# Patient Record
Sex: MALE | NOT HISPANIC OR LATINO | Employment: OTHER | URBAN - METROPOLITAN AREA
[De-identification: names, ages, dates, MRNs, and addresses within clinical notes are randomized per-mention and may not be internally consistent; named-entity substitution may affect disease eponyms.]

---

## 2017-03-08 ENCOUNTER — COMMUNICATION - HEALTHEAST (OUTPATIENT)
Dept: UROLOGY | Facility: CLINIC | Age: 37
End: 2017-03-08

## 2017-03-10 ENCOUNTER — OFFICE VISIT - HEALTHEAST (OUTPATIENT)
Dept: UROLOGY | Facility: CLINIC | Age: 37
End: 2017-03-10

## 2017-03-10 DIAGNOSIS — N13.2 HYDRONEPHROSIS WITH URINARY OBSTRUCTION DUE TO URETERAL CALCULUS: ICD-10-CM

## 2017-03-10 DIAGNOSIS — R82.81 PYURIA: ICD-10-CM

## 2017-03-10 DIAGNOSIS — R50.9 FEVER, UNSPECIFIED FEVER CAUSE: ICD-10-CM

## 2017-03-10 DIAGNOSIS — N20.9 URINARY CALCULUS, UNSPECIFIED: ICD-10-CM

## 2017-03-10 DIAGNOSIS — N20.0 CALCULUS OF KIDNEY: ICD-10-CM

## 2017-03-10 DIAGNOSIS — N20.1 CALCULUS OF URETER: ICD-10-CM

## 2017-03-10 DIAGNOSIS — R79.82 ELEVATED C-REACTIVE PROTEIN (CRP): ICD-10-CM

## 2017-03-10 DIAGNOSIS — N23 RENAL COLIC: ICD-10-CM

## 2017-03-10 ASSESSMENT — MIFFLIN-ST. JEOR: SCORE: 2369.77

## 2017-03-14 ENCOUNTER — COMMUNICATION - HEALTHEAST (OUTPATIENT)
Dept: UROLOGY | Facility: CLINIC | Age: 37
End: 2017-03-14

## 2017-03-17 ENCOUNTER — OFFICE VISIT - HEALTHEAST (OUTPATIENT)
Dept: UROLOGY | Facility: CLINIC | Age: 37
End: 2017-03-17

## 2017-03-17 ENCOUNTER — HOSPITAL ENCOUNTER (OUTPATIENT)
Dept: CT IMAGING | Facility: CLINIC | Age: 37
Discharge: HOME | End: 2017-03-17
Attending: PHYSICIAN ASSISTANT

## 2017-03-17 DIAGNOSIS — N20.0 CALCULUS OF KIDNEY: ICD-10-CM

## 2017-03-17 DIAGNOSIS — N28.1 RENAL CYST, LEFT: ICD-10-CM

## 2017-03-17 DIAGNOSIS — N20.9 URINARY CALCULUS, UNSPECIFIED: ICD-10-CM

## 2017-03-17 DIAGNOSIS — N23 RENAL COLIC: ICD-10-CM

## 2017-03-17 DIAGNOSIS — N28.89 RENAL MASS: ICD-10-CM

## 2017-03-17 DIAGNOSIS — N20.1 CALCULUS OF URETER: ICD-10-CM

## 2017-03-31 ENCOUNTER — OFFICE VISIT - HEALTHEAST (OUTPATIENT)
Dept: UROLOGY | Facility: CLINIC | Age: 37
End: 2017-03-31

## 2017-03-31 ENCOUNTER — HOSPITAL ENCOUNTER (OUTPATIENT)
Dept: CT IMAGING | Facility: CLINIC | Age: 37
Discharge: HOME | End: 2017-03-31
Attending: SPECIALIST

## 2017-03-31 DIAGNOSIS — N20.9 URINARY CALCULUS, UNSPECIFIED: ICD-10-CM

## 2017-03-31 DIAGNOSIS — N23 RENAL COLIC: ICD-10-CM

## 2017-03-31 DIAGNOSIS — N20.0 CALCULUS OF KIDNEY: ICD-10-CM

## 2017-03-31 DIAGNOSIS — N20.1 CALCULUS OF URETER: ICD-10-CM

## 2017-04-13 ENCOUNTER — HOSPITAL ENCOUNTER (OUTPATIENT)
Dept: CT IMAGING | Facility: CLINIC | Age: 37
Discharge: HOME | End: 2017-04-13
Attending: PHYSICIAN ASSISTANT

## 2017-04-13 ENCOUNTER — OFFICE VISIT - HEALTHEAST (OUTPATIENT)
Dept: UROLOGY | Facility: CLINIC | Age: 37
End: 2017-04-13

## 2017-04-13 DIAGNOSIS — N20.1 CALCULUS OF URETER: ICD-10-CM

## 2017-04-13 DIAGNOSIS — N20.9 URINARY CALCULUS, UNSPECIFIED: ICD-10-CM

## 2017-06-09 ENCOUNTER — OFFICE VISIT - HEALTHEAST (OUTPATIENT)
Dept: UROLOGY | Facility: CLINIC | Age: 37
End: 2017-06-09

## 2017-06-09 ENCOUNTER — HOSPITAL ENCOUNTER (OUTPATIENT)
Dept: CT IMAGING | Facility: CLINIC | Age: 37
Discharge: HOME | End: 2017-06-09
Attending: UROLOGY

## 2017-06-09 DIAGNOSIS — N20.9 URINARY CALCULUS, UNSPECIFIED: ICD-10-CM

## 2017-06-09 DIAGNOSIS — N20.1 CALCULUS OF URETER: ICD-10-CM

## 2017-06-09 DIAGNOSIS — N20.0 CALCULUS OF KIDNEY: ICD-10-CM

## 2021-05-30 VITALS — WEIGHT: 308 LBS | BODY MASS INDEX: 38.3 KG/M2 | HEIGHT: 75 IN

## 2021-06-09 NOTE — PROGRESS NOTES
Assessment/Plan:        Diagnoses and all orders for this visit:    Calculus of ureter  -     Symptom Control While Passing a Stone Education  -     CT Abdomen Pelvis Without Oral Without IV Contrast; Future; Expected date: 4/14/17    Calculus of kidney    Urinary calculus, unspecified  -     POCT urinalysis dipstick-[facility]      Stone Management Plan  [facility] Stone [facility] 3/10/2017 3/17/2017 3/31/2017   Urinary Tract Infection No suspicion of infection No suspicion of infection No suspicion of infection   Renal Colic Well controlled symptoms Well controlled symptoms Well controlled symptoms   Renal Failure No suspicion of renal failure No suspicion of renal failure No suspicion of renal failure   Current CT date 3/8/2017 3/17/2017 3/31/2017   Right sided stones? No No No   R Stone Event No current event No current event No current event   Left sided stones? Yes Yes Yes   L Number of ureteral stones 1 1 1   L GSD of ureteral stones 3 3 3   L Location of ureteral stone Proximal Distal Distal   L Number of kidney stones  1 1 Renal stones unchanged from last exam   L GSD of kidney stones 2 - 4 2 - 4 2 - 4   L Hydronephrosis Mild Mild None   L Stone Event New event Established event Established event   Diagnosis date 3/8/2017 - -   Initial location of primary symptomatic stone Proximal - -   Initial GSD of primary symptomatic stone 3 - -   L MET Status Initiation Progression No progression   L Current Plan MET MET MET   MET 1 week F/U 2 week F/U 2 week F/U             Subjective:      HPI  Mr. Brian M Haase is a 36 y.o.  male returning to the [facility] [place] for medical expulsive therapy follow up.     On last encounter, his 3 mm stone was in left distal ureter with mild hydronephrosis. He has had no unanticipated events.    Symptoms have been well controlled with medication and he is able to carry on normal activities. He is asymptomatic at present. He denies symptoms of fever, chills,  flank pain, nausea, vomiting, urinary frequency and dysuria.     New CT scan was personally reviewed and demonstrates no progression of stone with resolution of previous hydronephrosis. No change to 2 mm left mid pole calculus. Known, left upper pole cyst.    PLAN    First time stone former with no further progression of non-obstructing 2-3 mm left distal ureteral stone, asymptomatic.    He will continue to attempt to pass stone and will return in 2 weeks with further imaging.  If asymptomatic he may put this off for a week or 2.    Patient also seen and examined by JOSUE Lieberman   Review of systems is negative except for HPI.    Past Medical History:   Diagnosis Date     Fatty liver      Kidney stone 03/08/2017    at agge 36       Past Surgical History:   Procedure Laterality Date     ANTERIOR CRUCIATE LIGAMENT REPAIR       HAND SURGERY Right     reove some polyps       Current Outpatient Prescriptions   Medication Sig Dispense Refill     ciprofloxacin HCl (CIPRO) 250 MG tablet TK TWO TS PO BID FOR 14 DOSES  0     tamsulosin (FLOMAX) 0.4 mg Cp24 Take 1 capsule (0.4 mg total) by mouth daily for 14 days. 14 capsule 1     No current facility-administered medications for this visit.        No Known Allergies    Social History     Social History     Marital status: Single     Spouse name: N/A     Number of children: N/A     Years of education: N/A     Occupational History     Not on file.     Social History Main Topics     Smoking status: Never Smoker     Smokeless tobacco: Never Used     Alcohol use Yes      Comment: rare     Drug use: Yes     Special: Marijuana      Comment: Vapor type     Sexual activity: Not on file     Other Topics Concern     Not on file     Social History Narrative       Family History   Problem Relation Age of Onset     Urolithiasis Father      Diabetes Father      Cancer Paternal Grandfather      unknown type     Objective:      Physical Exam  There were no vitals filed for this  visit.  General - well developed, well nourished, appropriate for age. Appears no distress at this time  Abdomen - mildly obese soft, non-tender, no hepatosplenomegaly, no masses.   - no flank tenderness, no suprapubic tenderness, kidney and bladder non-palpable  MSK - normal spinal curvature. no spinal tenderness. normal gait. muscular strength intact.  Psych - oriented to time, place, and person, normal mood and affect.      Labs   Urinalysis POC (Office):  Blood UA   Date Value Ref Range Status   03/31/2017 Negative Negative Final   03/17/2017 Moderate Negative Final   03/10/2017 Trace Negative Final     Nitrite, UA   Date Value Ref Range Status   03/31/2017 Negative Negative Final   03/17/2017 Negative Negative Final   03/10/2017 Negative Negative Final   03/08/2017 Negative Negative Final     Leukocytes, UA    Date Value Ref Range Status   03/31/2017 Negative Negative Final   03/17/2017 Moderate (!) Negative Final   03/10/2017 Negative Negative Final     pH UA   Date Value Ref Range Status   03/31/2017 5.5 4.5 - 8.0 Final   03/17/2017 6.0 4.5 - 8.0 Final   03/10/2017 5.5 4.5 - 8.0 Final       Lab Urinalysis:  Blood, UA   Date Value Ref Range Status   03/08/2017 Large (!) Negative Final     Nitrite, UA   Date Value Ref Range Status   03/31/2017 Negative Negative Final   03/17/2017 Negative Negative Final   03/10/2017 Negative Negative Final   03/08/2017 Negative Negative Final     Leukocytes, UA   Date Value Ref Range Status   03/08/2017 Negative Negative Final     pH, UA   Date Value Ref Range Status   03/08/2017 7.5 4.5 - 8.0 Final

## 2021-06-09 NOTE — PROGRESS NOTES
Assessment/Plan:        Diagnoses and all orders for this visit:    Calculus of ureter  -     HYDROmorphone tablet 2-4 mg (DILAUDID); Take 1-2 tablets (2-4 mg total) by mouth once.  -     ketorolac injection 60 mg (TORADOL); Inject 2 mL (60 mg total) into the shoulder, thigh, or buttocks once.  -     Renal Function Profile  -     Symptom Control While Passing a Stone Education  -     CT Abdomen Pelvis Without Oral Without IV Contrast; Future; Expected date: 3/17/17  -     tamsulosin (FLOMAX) 0.4 mg Cp24; Take 1 capsule (0.4 mg total) by mouth daily for 14 days.  Dispense: 14 capsule; Refill: 1  -     HYDROmorphone (DILAUDID) 2 MG tablet; Take 1-2 tablets (2-4 mg total) by mouth every 4 (four) hours as needed for pain.  Dispense: 40 tablet; Refill: 0    Elevated C-reactive protein (CRP)  -     Discontinue: ciprofloxacin HCl tablet 500 mg (CIPRO); Take 1 tablet (500 mg total) by mouth once.  -     Discontinue: ciprofloxacin HCl (CIPRO) tablet 750 mg; Take 750 mg by mouth once.    Fever, unspecified fever cause  -     Culture, Urine  -     HM1(CBC and Differential)  -     C-Reactive Protein(CRP)  -     Renal Function Profile  -     HM1 (CBC with Diff)    Pyuria  -     Culture, Urine  -     C-Reactive Protein(CRP)    Renal colic  -     ketorolac (TORADOL) 10 mg tablet; Take 1 tablet (10 mg total) by mouth every 6 (six) hours as needed.  Dispense: 20 tablet; Refill: 0    Hydronephrosis with urinary obstruction due to ureteral calculus    Calculus of kidney    Urinary calculus, unspecified  -     POCT urinalysis dipstick-[facility]    Other orders  -     ketorolac (TORADOL) 60 mg/2 mL injection;   -     HYDROmorphone (DILAUDID) 2 MG tablet;   -     ciprofloxacin HCl tablet 500 mg (CIPRO); Take 1 tablet (500 mg total) by mouth once.  -     ciprofloxacin HCl tablet 250 mg (CIPRO); Take 1 tablet (250 mg total) by mouth once.  -     ciprofloxacin HCl (CIPRO) 250 MG tablet;   -     ciprofloxacin HCl (CIPRO) 500 MG tablet;        Stone Management Plan  [facility] Stone Management 3/10/2017   Urinary Tract Infection No suspicion of infection   Renal Colic Well controlled symptoms   Renal Failure No suspicion of renal failure   Current CT date 3/3/2017   Right sided stones? No   R Stone Event No current event   Left sided stones? Yes   L Number of ureteral stones 1   L GSD of ureteral stones 3   L Location of ureteral stone Proximal   L Number of kidney stones  1   L GSD of kidney stones 2 - 4   L Hydronephrosis Mild   L Stone Event New event   Diagnosis date 3/8/2017   Initial location of primary symptomatic stone Proximal   Initial GSD of primary symptomatic stone 3   L MET Status Initiation   L Current Plan MET   MET 2 week F/U         Subjective:      HPI  Mr. Brian M Haase is a 36 y.o.  male presenting to the [facility] [place] for after recent [facility] ED visit for urolithiasis.    He is a first time unidentified composition stone former. He has no identified modifiable stone risk factors. He has identified non-modifiable stone risks including:  limited family history.    Primary symptom at presentation was acute onset, sharp, constant left lower abdominal pain x couple hours. The pain radiated to the left flank. The pain fluctuated in severity, reaching 9/10 at its worst with no associated alleviating or aggravating factors. No similar pain events in the past. Significant associated symptoms at presentation included:  decreased appetite. He was seen in the ED 3/8 with CT reporting a mildly obstructing 2 mm left proximal ureteral stone. Labs demonstrated no leukocytosis or renal impairment. Urinalysis demonstrated mild pyuria. No urine culture ordered. He was discharged home with ciprofloxacin and percocet. He never picked up the antibiotic. He has been taking 1 tab percocet every 6 hours. Additionally, he admits to using vaporized marijuana for pain, with minimal relief. Significant current symptoms include:   Worsening 4/10 left flank pain and abdominal discomfort. He states he feels constipated, with no bowel movement x 2 days. Pertinent negative current symptoms include:  fever, chills, right flank pain, nausea, vomiting, hematuria, urinary frequency and dysuria.     CT scan from 3/8/2017 is personally reviewed and demonstrates a mildly obstructing 3-4 mm left proximal ureteral calculus. Additional 2 mm left mid pole calculus.  There is ~  7 cm left upper pole indeterminate mass which may represent a complex cyst or diverticulum. He mentions he was made aware of a renal cyst ~ 4 years ago with no further follow up.    Significant labs from presentation include severe hematuria, mild pyuria, negative nitrite, no bacteria, normal WBC, normal creatinine and mildly low potassium (3.2). Acute labs repeated today demonstrate stable WBC (11.4), elevated CRP (6.3), and acute renal insufficiency creatinine (1.69), K (3.9).     Provided in office injection of toradol 60 mg IM once and dilaudid 2 mg 2 tabs for relief of acute renal colic.     PLAN    First time stone former with low grade fever on presentation, known mildly obstructing left proximal ureteral calculus. Suspicion for evolving infection with elevated CRP.    Before exit, Jack was given an oral dose of ciprofloxacin 750 mg once. He is aware to fill his ciprofloxacin prescription and start antibiotic tonight. Additionally, he was provided prescriptions for flomax, toradol (in place of ibuprofen), and dilaudid. Recommend over the counter medications of ibuprofen and dramamine.    Discussed proceeding with stent insertion today for drainage of kidney, given possible evolving urinary tract infection with obstructing stone. However, patient declines at this time. He understands he needs to return to ED if fever progresses or he develops intractable pain or vomiting. Will proceed with medical expulsive therapy. Risks and benefits were detailed of medical expulsive therapy  including probability of stone passage, recurrent renal colic, and requirement of emergency medical and/or surgical care and further imaging. Patient verbalized understanding. Patient agrees with plan as discussed. He will return in 1 week with CT without and with IV contrast to evaluate ureteral stone and left upper pole mass..    Patient also seen and examined by JOSUE Lieberman   Review of Systems  A 12 point comprehensive review of systems is negative except for HPI    Past Medical History:   Diagnosis Date     Fatty liver      Kidney stone 03/08/2017    at agge 36     Past Surgical History:   Procedure Laterality Date     ANTERIOR CRUCIATE LIGAMENT REPAIR       HAND SURGERY Right     reove some polyps     Current Outpatient Prescriptions   Medication Sig Dispense Refill     oxyCODONE-acetaminophen (PERCOCET) 5-325 mg per tablet Take 1 tablet by mouth every 6 (six) hours as needed. 12 tablet 0     ciprofloxacin HCl (CIPRO) 500 MG tablet Take 1 tablet (500 mg total) by mouth 2 (two) times a day for 14 doses. 14 tablet 0     No current facility-administered medications for this visit.      No Known Allergies    Social History     Social History     Marital status: Single     Spouse name: N/A     Number of children: N/A     Years of education: N/A     Occupational History     Not on file.     Social History Main Topics     Smoking status: Never Smoker     Smokeless tobacco: Never Used     Alcohol use Yes      Comment: rare     Drug use: Yes     Special: Marijuana      Comment: Vapor type     Sexual activity: Not on file     Other Topics Concern     Not on file     Social History Narrative     No narrative on file       Family History   Problem Relation Age of Onset     Urolithiasis Father      Diabetes Father      Cancer Paternal Grandfather      unknown type       Objective:      Physical Exam  Vitals:    03/10/17 1004   BP: 134/84   Pulse: 99   Temp: 99.7  F (37.6  C)     General - well developed, well  nourished, appropriate for age. Appears moderately uncomfortable, clammy   Heart - regular rate and rhythm, no murmur  Respiratory - normal effort, clear to auscultation, good air entry without adventitious noises  Abdomen - moderately obese soft, non-tender, no hepatosplenomegaly, no masses.   - left flank  moderate tenderness, no suprapubic tenderness, kidney and bladder non-palpable  MSK - normal spinal curvature. no spinal tenderness. normal gait. muscular strength intact.  Neurology - cranial nerves II-XII grossly intact, normal sensation, no unsteadiness  Skin - intact, no bruising, no gouty tophi  Psych - oriented to time, place, and person, normal mood and affect.    Labs  Urinalysis POC (Office):  BLOOD UA   Date Value Ref Range Status   03/10/2017 Trace Negative Final     NITRITE, UA   Date Value Ref Range Status   03/10/2017 Negative Negative Final   03/08/2017 Negative Negative Final     LEUKOCYTES, UA    Date Value Ref Range Status   03/10/2017 Negative Negative Final     PH UA   Date Value Ref Range Status   03/10/2017 5.5 4.5 - 8.0 Final       Lab Urinalysis:  BLOOD, UA   Date Value Ref Range Status   03/08/2017 Large (!) Negative Final     NITRITE, UA   Date Value Ref Range Status   03/10/2017 Negative Negative Final   03/08/2017 Negative Negative Final     LEUKOCYTES, UA   Date Value Ref Range Status   03/08/2017 Negative Negative Final     PH, UA   Date Value Ref Range Status   03/08/2017 7.5 4.5 - 8.0 Final    and Acute Labs   CBC   WBC   Date Value Ref Range Status   03/08/2017 10.5 4.0 - 11.0 thou/uL Final     HEMOGLOBIN   Date Value Ref Range Status   03/08/2017 15.5 14.0 - 18.0 g/dL Final     PLATELETS   Date Value Ref Range Status   03/08/2017 249 140 - 440 thou/uL Final    and Renal Panel  KSI  CREATININE   Date Value Ref Range Status   03/08/2017 1.23 0.70 - 1.30 mg/dL Final     POTASSIUM   Date Value Ref Range Status   03/08/2017 3.2 (L) 3.5 - 5.0 mmol/L Final     CALCIUM   Date Value  Ref Range Status   03/08/2017 9.6 8.5 - 10.5 mg/dL Final

## 2021-06-09 NOTE — PROGRESS NOTES
Assessment/Plan:        Diagnoses and all orders for this visit:    Urinary calculus, unspecified  -     POCT urinalysis dipstick-[facility]  -     ketorolac (TORADOL) 10 mg tablet; Take 1 tablet (10 mg total) by mouth every 6 (six) hours as needed for pain.  Dispense: 20 tablet; Refill: 0  -     Symptom Control While Passing a Stone Education  -     CT Abdomen Pelvis Without Oral Without IV Contrast; Future; Expected date: 3/31/17    Calculus of ureter  -     tamsulosin (FLOMAX) 0.4 mg Cp24; Take 1 capsule (0.4 mg total) by mouth daily for 14 days.  Dispense: 14 capsule; Refill: 1  -     HYDROmorphone (DILAUDID) 2 MG tablet; Take 1-2 tablets (2-4 mg total) by mouth every 4 (four) hours as needed for pain.  Dispense: 40 tablet; Refill: 0  -     ketorolac (TORADOL) 10 mg tablet; Take 1 tablet (10 mg total) by mouth every 6 (six) hours as needed for pain.  Dispense: 20 tablet; Refill: 0  -     Symptom Control While Passing a Stone Education  -     CT Abdomen Pelvis Without Oral Without IV Contrast; Future; Expected date: 3/31/17    Renal colic  -     ketorolac (TORADOL) 10 mg tablet; Take 1 tablet (10 mg total) by mouth every 6 (six) hours as needed for pain.  Dispense: 20 tablet; Refill: 0  -     Symptom Control While Passing a Stone Education  -     CT Abdomen Pelvis Without Oral Without IV Contrast; Future; Expected date: 3/31/17    Calculus of kidney  -     Symptom Control While Passing a Stone Education  -     CT Abdomen Pelvis Without Oral Without IV Contrast; Future; Expected date: 3/31/17    Renal cyst, left    Other orders  -     ciprofloxacin HCl (CIPRO) 250 MG tablet; TK TWO TS PO BID FOR 14 DOSES; Refill: 0      Stone Management Plan  [facility] Stone Management 3/10/2017 3/17/2017   Urinary Tract Infection No suspicion of infection No suspicion of infection   Renal Colic Well controlled symptoms Well controlled symptoms   Renal Failure No suspicion of renal failure No suspicion of renal failure   Current CT date  3/8/2017 3/17/2017   Right sided stones? No No   R Stone Event No current event No current event   Left sided stones? Yes Yes   L Number of ureteral stones 1 1   L GSD of ureteral stones 3 3   L Location of ureteral stone Proximal Distal   L Number of kidney stones  1 1   L GSD of kidney stones 2 - 4 2 - 4   L Hydronephrosis Mild Mild   L Stone Event New event Established event   Diagnosis date 3/8/2017 -   Initial location of primary symptomatic stone Proximal -   Initial GSD of primary symptomatic stone 3 -   L MET Status Initiation Progression   L Current Plan MET MET   MET 1 week F/U 2 week F/U             Subjective:      HPI  Mr. Brian M Haase is a 36 y.o.  male returning to the [facility] [place] for follow-up of left proximal ureteral stone diagnosed 3/8/2017 measuring 3-4 mm in size with a 2 mm left lower pole stone.  Patient had a 7 cm left upper pole mass that appeared most likely to be a complex cyst.  He presents at this time with symptoms well controlled with follow-up CT scan having been obtained without with IV contrast.  Patient has had no pain in the last several days and no voiding symptoms of urgency or frequency.  UA today specific gravity 1.010.  6 moderate blood negative nitrate moderate leukocytes.      Personal review of CT scan obtained today 3/17/2017 without and with IV contrast demonstrates a nonenhancing 7 cm cyst in the left upper pole HU 12-13.  The 2 mm stone is again apparent in the lower pole and a 3-4 mm stone is seen in the left distal ureter 4-5 cm from the UVJ.  There is mild hydroureteronephrosis.  There are no stones seen on the right.     Impression left ureteral stone without distal having progressed from proximal, known 2 mm left renal stone with nonenhancing left upper pole renal cyst.    Plan continue medical expulsive therapy with Flomax, ibuprofen and use of Toradol with acute pain and if ineffective Dilaudid follow-up 2 weeks and may extend to  3 weeks for stone has not passed and is asymptomatic.  Patient given additional Flomax and Toradol and Dilaudid.                   ROS   Review of systems is negative except for HPI.    Past Medical History:   Diagnosis Date     Fatty liver      Kidney stone 03/08/2017    at agge 36       Past Surgical History:   Procedure Laterality Date     ANTERIOR CRUCIATE LIGAMENT REPAIR       HAND SURGERY Right     reove some polyps       Current Outpatient Prescriptions   Medication Sig Dispense Refill     HYDROmorphone (DILAUDID) 2 MG tablet Take 1-2 tablets (2-4 mg total) by mouth every 4 (four) hours as needed for pain. 40 tablet 0     oxyCODONE-acetaminophen (PERCOCET) 5-325 mg per tablet Take 1 tablet by mouth every 6 (six) hours as needed. 12 tablet 0     tamsulosin (FLOMAX) 0.4 mg Cp24 Take 1 capsule (0.4 mg total) by mouth daily for 14 days. 14 capsule 1     No current facility-administered medications for this visit.        No Known Allergies    Social History     Social History     Marital status: Single     Spouse name: N/A     Number of children: N/A     Years of education: N/A     Occupational History     Not on file.     Social History Main Topics     Smoking status: Never Smoker     Smokeless tobacco: Never Used     Alcohol use Yes      Comment: rare     Drug use: Yes     Special: Marijuana      Comment: Vapor type     Sexual activity: Not on file     Other Topics Concern     Not on file     Social History Narrative     No narrative on file       Family History   Problem Relation Age of Onset     Urolithiasis Father      Diabetes Father      Cancer Paternal Grandfather      unknown type     Objective:      Physical Exam  Vitals:    03/17/17 0818   BP: 130/80   Pulse: 91   Temp: 98  F (36.7  C)     General - well developed, well nourished, appropriate for age. Appears no distress at this time  Abdomen - mildly obese soft, non-tender, no hepatosplenomegaly, no masses.   - no flank tenderness, no suprapubic  tenderness, kidney and bladder non-palpable  MSK - normal spinal curvature. no spinal tenderness. normal gait. muscular strength intact.  Psych - oriented to time, place, and person, normal mood and affect.      Labs   Urinalysis POC (Office):  BLOOD UA   Date Value Ref Range Status   03/17/2017 Moderate Negative Final   03/10/2017 Trace Negative Final     NITRITE, UA   Date Value Ref Range Status   03/17/2017 Negative Negative Final   03/10/2017 Negative Negative Final   03/08/2017 Negative Negative Final     LEUKOCYTES, UA    Date Value Ref Range Status   03/17/2017 Moderate (!) Negative Final   03/10/2017 Negative Negative Final     PH UA   Date Value Ref Range Status   03/17/2017 6.0 4.5 - 8.0 Final   03/10/2017 5.5 4.5 - 8.0 Final       Lab Urinalysis:  BLOOD, UA   Date Value Ref Range Status   03/08/2017 Large (!) Negative Final     NITRITE, UA   Date Value Ref Range Status   03/17/2017 Negative Negative Final   03/10/2017 Negative Negative Final   03/08/2017 Negative Negative Final     LEUKOCYTES, UA   Date Value Ref Range Status   03/08/2017 Negative Negative Final     PH, UA   Date Value Ref Range Status   03/08/2017 7.5 4.5 - 8.0 Final

## 2021-06-10 NOTE — PROGRESS NOTES
Assessment/Plan:        Diagnoses and all orders for this visit:    Calculus of ureter  -     Symptom Control While Passing a Stone Education  -     CT Abdomen Pelvis Without Oral Without IV Contrast; Future; Expected date: 5/13/17    Urinary calculus, unspecified  -     POCT urinalysis dipstick-[facility]      Stone Management Plan  [facility] 3/17/2017 3/31/2017 4/13/2017   Urinary Tract Infection No suspicion of infection No suspicion of infection No suspicion of infection   Renal Colic Well controlled symptoms Well controlled symptoms Well controlled symptoms   Renal Failure No suspicion of renal failure No suspicion of renal failure No suspicion of renal failure   Current CT date 3/17/2017 3/31/2017 4/13/2017   Right sided stones? No No No   R Stone Event No current event No current event No current event   Left sided stones? Yes Yes Yes   L Number of ureteral stones 1 1 1   L GSD of ureteral stones 3 3 3   L Location of ureteral stone Distal Distal Distal   L Number of kidney stones  1 Renal stones unchanged from last exam Renal stones unchanged from last exam   L GSD of kidney stones 2 - 4 2 - 4 2 - 4   L Hydronephrosis Mild None None   L Stone Event Established event Established event Established event   Diagnosis date - - -   Initial location of primary symptomatic stone - - -   Initial GSD of primary symptomatic stone - - -   L MET Status Progression No progression No progression   L Current Plan MET MET MET   MET 2 week F/U 2 week F/U -             Subjective:      HPI  Mr. Brian M Haase is a 36 y.o.  male returning to the [facility] [place] for medical expulsive therapy follow up.     On last encounter, his 2 mm stone was in left distal ureter with mild hydronephrosis. He has had no unanticipated events.    Symptoms have been well controlled with medication and he is able to carry on normal activities. He is asymptomatic at present. He denies symptoms of fever, chills,  flank pain, nausea, vomiting, urinary frequency and dysuria.     New CT scan was personally reviewed and demonstrates no progression of stone with no hydronephrosis.     He will continue to attempt to pass stone and will return in 1 month with further imaging. .    He is not bothered by this small distal stone but will need clearance if it persists at next encounter.     ROS   Review of systems is negative except for HPI.    Past Medical History:   Diagnosis Date     Fatty liver      Kidney stone 03/08/2017    at agge 36       Past Surgical History:   Procedure Laterality Date     ANTERIOR CRUCIATE LIGAMENT REPAIR       HAND SURGERY Right     reove some polyps       No current outpatient prescriptions on file.     No current facility-administered medications for this visit.        No Known Allergies    Social History     Social History     Marital status: Single     Spouse name: N/A     Number of children: N/A     Years of education: N/A     Occupational History     Not on file.     Social History Main Topics     Smoking status: Never Smoker     Smokeless tobacco: Never Used     Alcohol use Yes      Comment: rare     Drug use: Yes     Special: Marijuana      Comment: Vapor type     Sexual activity: Not on file     Other Topics Concern     Not on file     Social History Narrative       Family History   Problem Relation Age of Onset     Urolithiasis Father      Diabetes Father      Cancer Paternal Grandfather      unknown type     Objective:      Physical Exam  Vitals:    04/13/17 0928   BP: 115/69   Pulse: 89   Temp: 98.1  F (36.7  C)     General - well developed, well nourished, appropriate for age. Appears no distress at this time  Abdomen - mildly obese soft, non-tender, no hepatosplenomegaly, no masses.   - no flank tenderness, no suprapubic tenderness, kidney and bladder non-palpable  MSK - normal spinal curvature. no spinal tenderness. normal gait. muscular strength intact.  Psych - oriented to time, place,  and person, normal mood and affect.      Labs   Urinalysis POC (Office):  Blood UA   Date Value Ref Range Status   04/13/2017 Negative Negative Final   03/31/2017 Negative Negative Final   03/17/2017 Moderate Negative Final     Nitrite, UA   Date Value Ref Range Status   04/13/2017 Negative Negative Final   03/31/2017 Negative Negative Final   03/17/2017 Negative Negative Final   03/08/2017 Negative Negative Final     Leukocytes, UA    Date Value Ref Range Status   04/13/2017 Negative Negative Final   03/31/2017 Negative Negative Final   03/17/2017 Moderate (!) Negative Final     pH UA   Date Value Ref Range Status   04/13/2017 6.5 4.5 - 8.0 Final   03/31/2017 5.5 4.5 - 8.0 Final   03/17/2017 6.0 4.5 - 8.0 Final       Lab Urinalysis:  Blood, UA   Date Value Ref Range Status   03/08/2017 Large (!) Negative Final     Nitrite, UA   Date Value Ref Range Status   04/13/2017 Negative Negative Final   03/31/2017 Negative Negative Final   03/17/2017 Negative Negative Final   03/08/2017 Negative Negative Final     Leukocytes, UA   Date Value Ref Range Status   03/08/2017 Negative Negative Final     pH, UA   Date Value Ref Range Status   03/08/2017 7.5 4.5 - 8.0 Final

## 2021-06-11 NOTE — PROGRESS NOTES
Assessment/Plan:        Diagnoses and all orders for this visit:    Calculus of ureter    Calculus of kidney  -     Stone Formation, 24 Hour Urine x2; Standing  -     Renal Function Profile; Future; Expected date: 6/23/17  -     Uric Acid; Future; Expected date: 6/23/17  -     Magnesium; Future; Expected date: 6/23/17  -     24 Hour Urine Collection Steps Education    Urinary calculus, unspecified  -     Urinalysis Macroscopic      Stone Management Plan  [facility] 3/31/2017 4/13/2017 6/9/2017   Urinary Tract Infection No suspicion of infection No suspicion of infection No suspicion of infection   Renal Colic Well controlled symptoms Well controlled symptoms Asymptomatic at this time   Renal Failure No suspicion of renal failure No suspicion of renal failure No suspicion of renal failure   Current CT date 3/31/2017 4/13/2017 6/9/2017   Right sided stones? No No No   R Stone Event No current event No current event No current event   Left sided stones? Yes Yes No   L Number of ureteral stones 1 1 No ureteral stones   L GSD of ureteral stones 3 3 -   L Location of ureteral stone Distal Distal -   L Number of kidney stones  Renal stones unchanged from last exam Renal stones unchanged from last exam Renal stones unchanged from last exam   L GSD of kidney stones 2 - 4 2 - 4 2 - 4   L Hydronephrosis None None None   L Stone Event Established event Established event Resolved event   Diagnosis date - - -   Initial location of primary symptomatic stone - - -   Initial GSD of primary symptomatic stone - - -   Resolved date - - 6/9/2017   L MET Status No progression No progression Passed   L Current Plan MET MET Observe   MET 2 week F/U - -   Observe rationale - - Limited stone burden with good prognosis for spontaneous passage         Subjective:      HPI  Mr. Brian M Haase is a 37 y.o.  male returning to the [facility] [place] for medical expulsive therapy follow up.     On last encounter,  his 2 mm stone was in left distal ureter with no hydronephrosis. He has had no unanticipated events.    Symptoms have been minimal in the > 1 month interval since his last visit. He had a couple mild episodes of urinary urgency but has otherwise felt well. He is asymptomatic at present. He denies symptoms of fever, chills, flank pain, nausea, vomiting, urinary frequency and dysuria.     New CT scan was personally reviewed and demonstrates passage of stone with no hydronephrosis. No change to 2 mm left mid pole papillary tip calcification. No right sided stones.    PLAN     38 yo M first time stone former with interval passage of a long standing left distal ureteral calculus, no specimen retrieved. Stable, non-obstructing left mid pole calculus, asymptomatic.    He is congratulated on passing his stone. He would like to proceed with a formal stone risk evaluation. However, he wants to confirm insurance coverage before finalizing decision to complete workup. If approved, he will proceed with serum stone risk chemistries and collection of two 24 hour urine specimens.    No additional long term follow up planned at this time.       ROS   Review of systems is negative except for HPI.    Past Medical History:   Diagnosis Date     Fatty liver      Kidney stone 03/08/2017    at agge 36       Past Surgical History:   Procedure Laterality Date     ANTERIOR CRUCIATE LIGAMENT REPAIR       HAND SURGERY Right     reove some polyps       No current outpatient prescriptions on file.     No current facility-administered medications for this visit.        No Known Allergies    Social History     Social History     Marital status: Single     Spouse name: N/A     Number of children: N/A     Years of education: N/A     Occupational History     Not on file.     Social History Main Topics     Smoking status: Never Smoker     Smokeless tobacco: Never Used     Alcohol use Yes      Comment: rare     Drug use: Yes     Special: Marijuana       Comment: Vapor type     Sexual activity: Not on file     Other Topics Concern     Not on file     Social History Narrative     No narrative on file       Family History   Problem Relation Age of Onset     Urolithiasis Father      Diabetes Father      Cancer Paternal Grandfather      unknown type     Objective:      Physical Exam  Vitals:    06/09/17 0906   BP: 133/80   Pulse: 79   Temp: 98.6  F (37  C)     General - well developed, well nourished, appropriate for age. Appears no distress at this time  Abdomen - moderately obese soft, non-tender, no hepatosplenomegaly, no masses.   - no flank tenderness, no suprapubic tenderness, kidney and bladder non-palpable  MSK - normal spinal curvature. no spinal tenderness. normal gait. muscular strength intact.  Psych - oriented to time, place, and person, normal mood and affect.      Labs   Urinalysis POC (Office):  Blood UA   Date Value Ref Range Status   04/13/2017 Negative Negative Final   03/31/2017 Negative Negative Final   03/17/2017 Moderate Negative Final     Nitrite, UA   Date Value Ref Range Status   06/09/2017 Negative Negative Final   04/13/2017 Negative Negative Final   03/31/2017 Negative Negative Final   03/17/2017 Negative Negative Final   03/08/2017 Negative Negative Final     Leukocytes, UA    Date Value Ref Range Status   04/13/2017 Negative Negative Final   03/31/2017 Negative Negative Final   03/17/2017 Moderate (!) Negative Final     pH UA   Date Value Ref Range Status   04/13/2017 6.5 4.5 - 8.0 Final   03/31/2017 5.5 4.5 - 8.0 Final   03/17/2017 6.0 4.5 - 8.0 Final       Lab Urinalysis:  Blood, UA   Date Value Ref Range Status   06/09/2017 Negative Negative Final   03/08/2017 Large (!) Negative Final     Nitrite, UA   Date Value Ref Range Status   06/09/2017 Negative Negative Final   04/13/2017 Negative Negative Final   03/31/2017 Negative Negative Final   03/17/2017 Negative Negative Final   03/08/2017 Negative Negative Final     Leukocytes, UA    Date Value Ref Range Status   06/09/2017 Negative Negative Final   03/08/2017 Negative Negative Final     pH, UA   Date Value Ref Range Status   06/09/2017 7.5 4.5 - 8.0 Final   03/08/2017 7.5 4.5 - 8.0 Final

## 2022-04-19 ENCOUNTER — OFFICE VISIT (OUTPATIENT)
Dept: URGENT CARE | Facility: URGENT CARE | Age: 42
End: 2022-04-19
Payer: COMMERCIAL

## 2022-04-19 VITALS — SYSTOLIC BLOOD PRESSURE: 138 MMHG | TEMPERATURE: 98.4 F | HEART RATE: 102 BPM | DIASTOLIC BLOOD PRESSURE: 88 MMHG

## 2022-04-19 DIAGNOSIS — S61.313A LACERATION OF LEFT MIDDLE FINGER W/O FOREIGN BODY WITH DAMAGE TO NAIL, INITIAL ENCOUNTER: Primary | ICD-10-CM

## 2022-04-19 PROCEDURE — 99203 OFFICE O/P NEW LOW 30 MIN: CPT | Performed by: FAMILY MEDICINE

## 2022-04-19 RX ORDER — ACETAMINOPHEN 325 MG/1
975 TABLET ORAL ONCE
Status: ACTIVE | OUTPATIENT
Start: 2022-04-19

## 2022-04-19 ASSESSMENT — PAIN SCALES - GENERAL: PAINLEVEL: SEVERE PAIN (7)

## 2022-04-23 NOTE — PROGRESS NOTES
SUBJECTIVE:  Brian M Haase, a 41 year old male scheduled an appointment to discuss the following issues:  Laceration of left middle finger w/o foreign body with damage to nail, initial encounter    Medical, social, surgical, and family histories reviewed.     Laceration  Laceration of left middle finger w/o foreign body with damage to nail---was cutting with kitchen knife when he accidentally sliced of a piece of his nail of his left 3rd finger about half hour ago.  Last Tdap 2014.  Bled from the injury, still bleeding small amount on arrival.  No loss of function of his fingers.  No head/neck/other injuries. Not on anticoagulation.      ROS:  See HPI.  No nausea/vomiting.  No fever/chills.  No chest pain/SOB.  No BM/urine problems.  No dizziness or syncope.      OBJECTIVE:  /88   Pulse 102   Temp 98.4  F (36.9  C)   EXAM:  GENERAL APPEARANCE: alert and mild distress  HEENT: normal, no evidence of head/neck injuries  RESP: breathing easy  CV: regular rates and rhythm  MS: extremities normal- no gross deformities noted  SKIN: With topical viscous lidocaine for local anesthesia, pt's wound cleansed with clean running water and antiseptic and dried; gelfoam applied with pressure and hemostasis achieved.  Adaptic non-stick dressing applied on top of the gelfoam.  NEURO: Normal strength and tone, mentation intact and speech normal, non-focal      ASSESSMENT/PLAN:  (S66.596G) Laceration of left middle finger w/o foreign body with damage to nail, initial encounter  (primary encounter diagnosis)  Plan: acetaminophen (TYLENOL) tablet 975 mg   Wound care instructions given.  Pt to f/up PCP within 1 week if no improvement or worsening.  Warning signs and symptoms explained.